# Patient Record
Sex: MALE
[De-identification: names, ages, dates, MRNs, and addresses within clinical notes are randomized per-mention and may not be internally consistent; named-entity substitution may affect disease eponyms.]

---

## 2023-09-01 ENCOUNTER — NURSE TRIAGE (OUTPATIENT)
Dept: OTHER | Facility: CLINIC | Age: 33
End: 2023-09-01

## 2023-09-01 NOTE — TELEPHONE ENCOUNTER
Location of patient: [address]    Received call from [address] at [facility] with Red Flag Complaint. Renate Flower MRN: [id number]    Provider: Dr. Joe Gutiérrez    Subjective: Caller states \"blood sugar\"     Current Symptoms: Patient experiencing low blood sugar symptoms when BS is 80-90. Patient states he was feeling sweaty, growling stomach, and leg cramps with SOB. Patient fell asleep for 10 minutes. Patient took blood sugar 91 and had a \"naked\" drink and all symptoms resolved. Patient having episodes of low blood sugar for 1 week. Patient reporting intermittent diarrhea for 2 weeks. Pain Severity: 0/10; Temperature: denies    What has been tried: \"naked\" drink    Recommended disposition: See PCP within 24 Hours    Care advice provided, patient verbalizes understanding; denies any other questions or concerns. Outcome: Call transferred to Curtis An at [facility]. Appointment scheduled for tomorrow.       This triage is a result of a call to the [address]    Reason for Disposition   [1] Blood glucose 70  mg/dL (3.9 mmol/L) or below OR symptomatic, now improved with Care Advice AND [2] cause unknown   Diarrhea is a chronic symptom (recurrent or ongoing AND present > 4 weeks)    Protocols used: Diabetes - Low Blood Sugar-ADULT-, Diarrhea-ADULT-AH